# Patient Record
Sex: FEMALE | Race: WHITE | NOT HISPANIC OR LATINO | Employment: FULL TIME | URBAN - METROPOLITAN AREA
[De-identification: names, ages, dates, MRNs, and addresses within clinical notes are randomized per-mention and may not be internally consistent; named-entity substitution may affect disease eponyms.]

---

## 2017-06-20 ENCOUNTER — TELEPHONE (OUTPATIENT)
Dept: CARDIOLOGY | Facility: CLINIC | Age: 42
End: 2017-06-20

## 2017-06-20 NOTE — TELEPHONE ENCOUNTER
----- Message from Steffi Beasley sent at 6/20/2017  1:56 PM CDT -----  Patient requesting to speak with nurse concerning Holter monitor results/has question/please call back at [phone] to advise.

## 2017-06-20 NOTE — TELEPHONE ENCOUNTER
Called and spoke with pt re 14 day event monitor place on at hospital. Results are still processing. She will call back later to check.

## 2017-07-28 ENCOUNTER — TELEPHONE (OUTPATIENT)
Dept: CARDIOLOGY | Facility: CLINIC | Age: 42
End: 2017-07-28

## 2017-07-28 NOTE — TELEPHONE ENCOUNTER
----- Message from Brooke Mccarthy sent at 7/28/2017  9:26 AM CDT -----  Contact: self  Patient had 14 day holter in May at [facility]. Dr Wilkerson in [place] is her Cardiologist.  However, [facility] states they sent the results to Dr Ronquillo and was told to contact your office for the results.  Please call back at [phone] (home).

## 2017-07-31 ENCOUNTER — TELEPHONE (OUTPATIENT)
Dept: CARDIOLOGY | Facility: CLINIC | Age: 42
End: 2017-07-31

## 2017-07-31 ENCOUNTER — PATIENT MESSAGE (OUTPATIENT)
Dept: CARDIOLOGY | Facility: CLINIC | Age: 42
End: 2017-07-31

## 2017-07-31 NOTE — TELEPHONE ENCOUNTER
----- Message from Araseli Martinez sent at 7/31/2017 12:24 PM CDT -----  Contact: Olive morales/Dr Aman Wilkerson [phone]  She is requesting that you send the results of the Holter monitor to be sent.  Thank you!